# Patient Record
Sex: FEMALE | Race: WHITE | Employment: OTHER | URBAN - METROPOLITAN AREA
[De-identification: names, ages, dates, MRNs, and addresses within clinical notes are randomized per-mention and may not be internally consistent; named-entity substitution may affect disease eponyms.]

---

## 2018-03-19 ENCOUNTER — OFFICE VISIT (OUTPATIENT)
Dept: NEUROLOGY | Age: 78
End: 2018-03-19
Payer: MEDICARE

## 2018-03-19 VITALS
DIASTOLIC BLOOD PRESSURE: 97 MMHG | BODY MASS INDEX: 35.33 KG/M2 | TEMPERATURE: 97.4 F | SYSTOLIC BLOOD PRESSURE: 171 MMHG | HEART RATE: 87 BPM | WEIGHT: 192 LBS | HEIGHT: 62 IN | RESPIRATION RATE: 18 BRPM | OXYGEN SATURATION: 94 %

## 2018-03-19 DIAGNOSIS — G35 MS (MULTIPLE SCLEROSIS) (HCC): Primary | ICD-10-CM

## 2018-03-19 PROCEDURE — 99214 OFFICE O/P EST MOD 30 MIN: CPT | Performed by: CLINICAL NURSE SPECIALIST

## 2018-03-19 PROCEDURE — 1036F TOBACCO NON-USER: CPT | Performed by: CLINICAL NURSE SPECIALIST

## 2018-03-19 PROCEDURE — 1090F PRES/ABSN URINE INCON ASSESS: CPT | Performed by: CLINICAL NURSE SPECIALIST

## 2018-03-19 PROCEDURE — G8427 DOCREV CUR MEDS BY ELIG CLIN: HCPCS | Performed by: CLINICAL NURSE SPECIALIST

## 2018-03-19 PROCEDURE — 4040F PNEUMOC VAC/ADMIN/RCVD: CPT | Performed by: CLINICAL NURSE SPECIALIST

## 2018-03-19 PROCEDURE — 1123F ACP DISCUSS/DSCN MKR DOCD: CPT | Performed by: CLINICAL NURSE SPECIALIST

## 2018-03-19 PROCEDURE — G8417 CALC BMI ABV UP PARAM F/U: HCPCS | Performed by: CLINICAL NURSE SPECIALIST

## 2018-03-19 PROCEDURE — G8400 PT W/DXA NO RESULTS DOC: HCPCS | Performed by: CLINICAL NURSE SPECIALIST

## 2018-03-19 PROCEDURE — G8484 FLU IMMUNIZE NO ADMIN: HCPCS | Performed by: CLINICAL NURSE SPECIALIST

## 2018-03-19 NOTE — PROGRESS NOTES
Helena Lawrence is a 69 y.o. right handed female     Long history of MS -- diagnosed decades ago by Dr Emily Hardin since refused DMT -- continues to do so today (given her age, she would not be a candidate for DMT)    Last imaging studies in 2013    Pains markedly improved since last appt - now following at the pain clinic     Reportedly had tried gabapentin, Lyrica as well as Trileptal in the past    Hesitant to trial Cymbalta in the past     No new neuro issues   Feels she is quite \"stable\"    Notices more unsteadiness but no falls reported     No new medical events    States BS have been stable    Very busy around the [place] lately     No chest pain or palpitations  No SOB  No vertigo, lightheadedness or loss of consciousness  No falls, tripping or stumbling  No incontinence of bowels or bladder  No itching or bruising appreciated  No numbness, tingling or focal arm/leg weakness    ROS otherwise negative     Prior to Visit Medications    Medication Sig Taking? Authorizing Provider   ACCU-CHEK RACHEL PLUS strip  Yes Historical Provider, MD   Accu-Chek Softclix Lancets MISC  Yes Historical Provider, MD   metFORMIN (GLUCOPHAGE) 1000 MG tablet   Take 1,000 mg by mouth 2 times daily (with meals)  Yes Historical Provider, MD   NUCYNTA ER 50 MG TB12   50 mg Pt takes 50 mg in am ,100 mg at night Yes Historical Provider, MD   HUMALOG MIX 75/25 KWIKPEN (75-25) 100 UNIT/ML SUPN injection pen 2 times daily (with meals) Pt takes 28 units and 26 units at night Yes Historical Provider, MD   BD PEN NEEDLE FAROOQ U/F 32G X 4 MM MISC  Yes Historical Provider, MD   irbesartan (AVAPRO) 300 MG tablet   Take 300 mg by mouth daily  Yes Historical Provider, MD   hydrochlorothiazide (HYDRODIURIL) 25 MG tablet Take 25 mg by mouth daily. Yes Historical Provider, MD   allopurinol (ZYLOPRIM) 100 MG tablet Take 100 mg by mouth daily. Yes Historical Provider, MD   atenolol (TENORMIN) 25 MG tablet Take 25 mg by mouth daily.  Yes Historical Provider, MD   levothyroxine (SYNTHROID) 25 MCG tablet Take 25 mcg by mouth Daily  Yes Historical Provider, MD   rosuvastatin (CRESTOR) 5 MG tablet Take 5 mg by mouth daily.  Yes Historical Provider, MD       Objective:       BP (!) 171/97 (Site: Left Arm, Position: Sitting, Cuff Size: Large Adult)   Pulse 87   Temp 97.4 °F (36.3 °C)   Resp 18   Ht 5' 2\" (1.575 m)   Wt 192 lb (87.1 kg)   SpO2 94%   BMI 35.12 kg/m²   afebrile      General appearance: alert, appears stated age and cooperative  Head: Normocephalic, without obvious abnormality, atraumatic  Neck: no adenopathy, no carotid bruit, no JVD, supple, symmetrical, limited ROM  Lungs: clear to auscultation bilaterally  Heart: regular rate and rhythm  Extremities: no cyanosis or edema - no clubbing   Pulses: 2+ and symmetric  Skin: No rashes or lesions     Mental Status: alert and oriented times three    Speech: clear    Language: normal      Cranial Nerves:  I: smell    II: visual acuity     II: visual fields Full    II: pupils BRIT   III,VII: ptosis    III,IV,VI: extraocular muscles  Full ROM   V: mastication Normal   V: facial light touch sensation  normal   V,VII: corneal reflex  Present   VII: facial muscle function - upper     VII: facial muscle function - lower Normal   VIII: hearing Normal   IX: soft palate elevation  Normal   IX,X: gag reflex Present   XI: trapezius strength  5/5   XI: sternocleidomastoid strength 5/5   XI: neck extension strength  5/5   XII: tongue strength  Normal       Motor:  5/5 throughout   Normal bulk and tone    Sensory:  LT and PP stocking glove to mid shin  Vibration moderately decreased in knees   Markedly decreased left ankle     Coordination:   FN, FFM and SAYRA symmetrical and decreased   HS symmetrical     Gait:  Wide based - petit pas   With rollator      DTR:   Right Brachioradialis reflex 0  Left Brachioradialis reflex 0  Right Biceps reflex 0  Left Biceps reflex 0  Right Quadriceps reflex 0  Left Quadriceps reflex 0  Right Achilles reflex 0  Left Achilles reflex 0    No Lemon's    Laboratory/Radiology:     A1C due this month     MRI Brain: 2013   Moderate periventricular WM disease    MRI C-spine 2013   No lesions identified   I appreciate moderate amount of stenosis    MRI T- spine 2013   No lesions identified    Imaging studies personally reviewed with her at this time     Assessment:     Long history of MS -- now with EDSS 7.0    Suspect transverse myelitis causing her burning sensations   Now following with the pain clinic     Diabetes    Suspect diabetic PN as well    Plan:      Will hold off on DMT and imaging studies    Will continue to manage symptomatically    Call with continued issues     RTO 6 months    Dock Luc  9:07 AM  3/19/2018

## 2018-08-06 ENCOUNTER — OFFICE VISIT (OUTPATIENT)
Dept: NEUROLOGY | Age: 78
End: 2018-08-06
Payer: MEDICARE

## 2018-08-06 VITALS
SYSTOLIC BLOOD PRESSURE: 149 MMHG | OXYGEN SATURATION: 96 % | HEART RATE: 80 BPM | HEIGHT: 64 IN | BODY MASS INDEX: 36.79 KG/M2 | DIASTOLIC BLOOD PRESSURE: 87 MMHG | TEMPERATURE: 97.9 F | WEIGHT: 215.5 LBS

## 2018-08-06 DIAGNOSIS — G35 MS (MULTIPLE SCLEROSIS) (HCC): Primary | ICD-10-CM

## 2018-08-06 PROCEDURE — G8417 CALC BMI ABV UP PARAM F/U: HCPCS | Performed by: CLINICAL NURSE SPECIALIST

## 2018-08-06 PROCEDURE — G8427 DOCREV CUR MEDS BY ELIG CLIN: HCPCS | Performed by: CLINICAL NURSE SPECIALIST

## 2018-08-06 PROCEDURE — 1101F PT FALLS ASSESS-DOCD LE1/YR: CPT | Performed by: CLINICAL NURSE SPECIALIST

## 2018-08-06 PROCEDURE — 4040F PNEUMOC VAC/ADMIN/RCVD: CPT | Performed by: CLINICAL NURSE SPECIALIST

## 2018-08-06 PROCEDURE — 1036F TOBACCO NON-USER: CPT | Performed by: CLINICAL NURSE SPECIALIST

## 2018-08-06 PROCEDURE — G8400 PT W/DXA NO RESULTS DOC: HCPCS | Performed by: CLINICAL NURSE SPECIALIST

## 2018-08-06 PROCEDURE — 1090F PRES/ABSN URINE INCON ASSESS: CPT | Performed by: CLINICAL NURSE SPECIALIST

## 2018-08-06 PROCEDURE — 99214 OFFICE O/P EST MOD 30 MIN: CPT | Performed by: CLINICAL NURSE SPECIALIST

## 2018-08-06 PROCEDURE — 1123F ACP DISCUSS/DSCN MKR DOCD: CPT | Performed by: CLINICAL NURSE SPECIALIST

## 2018-08-06 NOTE — PROGRESS NOTES
Jimbo Banda is a 69 y.o. right handed female     Long history of MS -- diagnosed decades ago by Dr Adarsh Mcfarland 7   Long since refused DMT -- continues to do so today (given her age, she would not be a candidate for DMT)    Last imaging studies in 2013 -- consistently refused subsequent imaging tests     Pains markedly improved since last appt - now following at the pain clinic   Reportedly had tried gabapentin, Lyrica as well as Trileptal in the past    Hesitant to trial Cymbalta     No new neuro issues   Feels she is quite \"stable\"    Notices more unsteadiness but no falls reported    Still using her Rolator without issues     No new medical events    States BS have been stable    Very busy around the [place] lately     No chest pain or palpitations  No SOB  No vertigo, lightheadedness or loss of consciousness  No falls, tripping or stumbling  No incontinence of bowels or bladder  No itching or bruising appreciated  No numbness, tingling or focal arm/leg weakness    ROS otherwise negative     Prior to Visit Medications    Medication Sig Taking? Authorizing Provider   ACCU-CHEK RACHEL PLUS strip  Yes Historical Provider, MD   Accu-Chek Softclix Lancets MISC  Yes Historical Provider, MD   metFORMIN (GLUCOPHAGE) 1000 MG tablet   Take 1,000 mg by mouth 2 times daily (with meals)  Yes Historical Provider, MD   NUCYNTA ER 50 MG TB12   50 mg Pt takes 50 mg in am ,100 mg at night Yes Historical Provider, MD   HUMALOG MIX 75/25 KWIKPEN (75-25) 100 UNIT/ML SUPN injection pen 2 times daily (with meals) Pt takes 28 units and 26 units at night Yes Historical Provider, MD   BD PEN NEEDLE FAROOQ U/F 32G X 4 MM MISC  Yes Historical Provider, MD   irbesartan (AVAPRO) 300 MG tablet   Take 300 mg by mouth daily  Yes Historical Provider, MD   hydrochlorothiazide (HYDRODIURIL) 25 MG tablet Take 25 mg by mouth daily. Yes Historical Provider, MD   allopurinol (ZYLOPRIM) 100 MG tablet Take 100 mg by mouth daily.  Yes Historical Provider, MD   atenolol (TENORMIN) 25 MG tablet Take 25 mg by mouth daily. Yes Historical Provider, MD   levothyroxine (SYNTHROID) 25 MCG tablet Take 25 mcg by mouth Daily  Yes Historical Provider, MD   rosuvastatin (CRESTOR) 5 MG tablet Take 5 mg by mouth daily.  Yes Historical Provider, MD       Objective:     BP (!) 149/87 (Site: Right Arm, Position: Sitting, Cuff Size: Large Adult)   Pulse 80   Temp 97.9 °F (36.6 °C)   Ht 5' 4\" (1.626 m)   Wt 215 lb 8 oz (97.8 kg)   SpO2 96%   BMI 36.99 kg/m²   afebrile      General appearance: alert, appears stated age and cooperative  Head: Normocephalic, without obvious abnormality, atraumatic  Neck: no adenopathy, no carotid bruit, no JVD, supple, symmetrical, limited ROM  Lungs: clear to auscultation bilaterally  Heart: regular rate and rhythm  Extremities: no cyanosis or edema - no clubbing   Pulses: 2+ and symmetric  Skin: No rashes or lesions     Mental Status: alert and oriented times three    Speech: clear  Language: normal      Cranial Nerves:  I: smell    II: visual acuity     II: visual fields Full    II: pupils BRIT   III,VII: ptosis    III,IV,VI: extraocular muscles  Full ROM   V: mastication Normal   V: facial light touch sensation  normal   V,VII: corneal reflex  Present   VII: facial muscle function - upper     VII: facial muscle function - lower Normal   VIII: hearing Normal   IX: soft palate elevation  Normal   IX,X: gag reflex Present   XI: trapezius strength  5/5   XI: sternocleidomastoid strength 5/5   XI: neck extension strength  5/5   XII: tongue strength  Normal       Motor:  5/5 throughout   Normal bulk and tone    Sensory:  LT and PP stocking glove to mid shin  Vibration moderately decreased in knees   Markedly decreased left ankle     Coordination:   FN, FFM and SAYRA symmetrical and decreased   HS symmetrical     Gait:  Wide based - petit pas   With rollator      DTR:   Right Brachioradialis reflex 0  Left Brachioradialis reflex 0  Right Biceps reflex 0  Left Biceps reflex 0  Right Quadriceps reflex 0  Left Quadriceps reflex 0  Right Achilles reflex 0  Left Achilles reflex 0    No Lemon's    Laboratory/Radiology:     A1C due this month     MRI Brain: 2013   Moderate periventricular WM disease    MRI C-spine 2013   No lesions identified   I appreciate moderate amount of stenosis    MRI T- spine 2013   No lesions identified    Imaging studies personally reviewed with her at this time     Assessment:     Long history of MS -- now with EDSS 7.0    Suspect transverse myelitis causing her burning sensations   Now following with the pain clinic     Diabetes    Suspect diabetic PN as well    Plan:      Will hold off on DMT and imaging studies     Will continue to manage symptomatically    Call with continued issues     RTO 6 months    Dragan Roman  10:55 AM  8/6/2018

## 2018-11-21 ENCOUNTER — TELEPHONE (OUTPATIENT)
Dept: NEUROLOGY | Age: 78
End: 2018-11-21

## 2019-01-04 ENCOUNTER — HOSPITAL ENCOUNTER (OUTPATIENT)
Age: 79
Discharge: HOME | End: 2019-01-06
Payer: MEDICARE

## 2019-01-04 LAB — RAPID HIV 1&2: NORMAL

## 2019-01-04 PROCEDURE — 86803 HEPATITIS C AB TEST: CPT

## 2019-01-04 PROCEDURE — 86703 HIV-1/HIV-2 1 RESULT ANTBDY: CPT

## 2019-01-04 PROCEDURE — 87340 HEPATITIS B SURFACE AG IA: CPT

## 2019-01-04 PROCEDURE — 86701 HIV-1ANTIBODY: CPT

## 2019-01-07 LAB
HEPATITIS B SURFACE ANTIGEN INTERPRETATION: NORMAL
HEPATITIS C ANTIBODY INTERPRETATION: NORMAL
HIV-1 AND HIV-2 ANTIBODIES: NORMAL

## 2019-10-21 ENCOUNTER — TELEPHONE (OUTPATIENT)
Dept: ADMINISTRATIVE | Age: 79
End: 2019-10-21

## 2020-02-10 ENCOUNTER — OFFICE VISIT (OUTPATIENT)
Dept: NEUROLOGY | Age: 80
End: 2020-02-10
Payer: MEDICARE

## 2020-02-10 VITALS
HEART RATE: 78 BPM | WEIGHT: 207 LBS | RESPIRATION RATE: 17 BRPM | SYSTOLIC BLOOD PRESSURE: 135 MMHG | BODY MASS INDEX: 38.09 KG/M2 | HEIGHT: 62 IN | DIASTOLIC BLOOD PRESSURE: 84 MMHG | OXYGEN SATURATION: 95 %

## 2020-02-10 PROCEDURE — 1123F ACP DISCUSS/DSCN MKR DOCD: CPT | Performed by: CLINICAL NURSE SPECIALIST

## 2020-02-10 PROCEDURE — G8427 DOCREV CUR MEDS BY ELIG CLIN: HCPCS | Performed by: CLINICAL NURSE SPECIALIST

## 2020-02-10 PROCEDURE — 99214 OFFICE O/P EST MOD 30 MIN: CPT | Performed by: CLINICAL NURSE SPECIALIST

## 2020-02-10 PROCEDURE — 1090F PRES/ABSN URINE INCON ASSESS: CPT | Performed by: CLINICAL NURSE SPECIALIST

## 2020-02-10 PROCEDURE — 1036F TOBACCO NON-USER: CPT | Performed by: CLINICAL NURSE SPECIALIST

## 2020-02-10 PROCEDURE — G8417 CALC BMI ABV UP PARAM F/U: HCPCS | Performed by: CLINICAL NURSE SPECIALIST

## 2020-02-10 PROCEDURE — 4040F PNEUMOC VAC/ADMIN/RCVD: CPT | Performed by: CLINICAL NURSE SPECIALIST

## 2020-02-10 PROCEDURE — G8484 FLU IMMUNIZE NO ADMIN: HCPCS | Performed by: CLINICAL NURSE SPECIALIST

## 2020-02-10 PROCEDURE — G8400 PT W/DXA NO RESULTS DOC: HCPCS | Performed by: CLINICAL NURSE SPECIALIST

## 2020-02-10 NOTE — PROGRESS NOTES
Adeel Chau is a 67 y.o. right handed female     Long history of MS -- diagnosed decades ago by Dr Leatha Kang since refused DMT -- continues to do so today (given her age, she would not be a candidate for DMT)    Last imaging studies in 2013 -- consistently refused subsequent imaging tests     Pains markedly improved now following at the pain clinic   Reportedly had tried gabapentin, Lyrica as well as Trileptal in the past    Hesitant to trial Cymbalta     No new neuro issues   Feels she is quite \"stable\"    Notices more unsteadiness but no falls reported    Still using her Rolator without issues     No new medical events    States BS have been stable    Very busy around the [place] lately     No chest pain or palpitations  No SOB  No vertigo, lightheadedness or loss of consciousness  No falls, tripping or stumbling  No incontinence of bowels or bladder  No itching or bruising appreciated  No numbness, tingling or focal arm/leg weakness    ROS otherwise negative     Prior to Visit Medications    Medication Sig Taking? Authorizing Provider   ACCU-CHEK RACHEL PLUS strip  Yes Historical Provider, MD   Accu-Chek Softclix Lancets MISC  Yes Historical Provider, MD   metFORMIN (GLUCOPHAGE) 1000 MG tablet   Take 1,000 mg by mouth 2 times daily (with meals)  Yes Historical Provider, MD   NUCYNTA ER 50 MG TB12   50 mg Pt takes 50 mg in am ,100 mg at night Yes Historical Provider, MD   HUMALOG MIX 75/25 KWIKPEN (75-25) 100 UNIT/ML SUPN injection pen 2 times daily (with meals) Pt takes 28 units and 26 units at night Yes Historical Provider, MD   BD PEN NEEDLE FAROOQ U/F 32G X 4 MM MISC  Yes Historical Provider, MD   irbesartan (AVAPRO) 300 MG tablet   Take 300 mg by mouth daily  Yes Historical Provider, MD   hydrochlorothiazide (HYDRODIURIL) 25 MG tablet Take 25 mg by mouth daily. Yes Historical Provider, MD   allopurinol (ZYLOPRIM) 100 MG tablet Take 100 mg by mouth daily.  Yes Historical Provider, MD   atenolol Quadriceps reflex 0  Left Quadriceps reflex 0  Right Achilles reflex 0  Left Achilles reflex 0    No Lemon's    Laboratory/Radiology:     A1C due this month     MRI Brain: 2013   Moderate periventricular WM disease    MRI C-spine 2013   No lesions identified   I appreciate moderate amount of stenosis    MRI T- spine 2013   No lesions identified    Imaging studies personally reviewed with her at this time     Assessment:     Long history of MS -- now with EDSS 7.0    Suspect transverse myelitis causing her burning sensations   Now following with the pain clinic     Diabetes    Suspect diabetic PN as well    Plan:      Will hold off on DMT and imaging studies     Will continue to manage symptomatically    Call with continued issues     RTO 6 months    Erasmo Brian  1:54 PM  2/10/2020

## 2020-09-02 ENCOUNTER — VIRTUAL VISIT (OUTPATIENT)
Dept: NEUROLOGY | Age: 80
End: 2020-09-02
Payer: MEDICARE

## 2020-09-02 PROCEDURE — 99443 PR PHYS/QHP TELEPHONE EVALUATION 21-30 MIN: CPT | Performed by: CLINICAL NURSE SPECIALIST

## 2020-09-02 NOTE — PROGRESS NOTES
Dorian Drummond is a 79 y.o. right handed female     evaluated via telephone on 9/2/2020. Consent:  She and/or health care decision maker is aware that that she may receive a bill for this telephone service, depending on her insurance coverage, and has provided verbal consent to proceed: Yes    I affirm this is a Patient Initiated Episode with an Established Patient who has not had a related appointment within my department in the past 7 days or scheduled within the next 24 hours. The patient's identity was verified at the start of the call. Total Time: minutes: 21-30 minutes    Consent:  The patient and/or health care decision maker is aware that that he may receive a bill for this telephone service, depending on his insurance coverage, and has provided verbal consent to proceed: Yes    Patient advised regarding steps to help prevent the spread of COVID-19   SOURCE - https://radha-caesar.info/. html     1-Stay home except to get medical care  2-Clean your hands often for atleast 20 secnds, avoid touching: Avoid touching your eyes, nose, and mouth with unwashed hands. 3-Seek medical attention: Seek prompt medical attention if your illness is worsening (e.g., difficulty breathing).   Call you doctor first.  3-Wear a facemask if you are sick   4-Cover your coughs and sneezes          Note: not billable if this call serves to triage the patient into an appointment for the relevant concern    Long history of MS -- diagnosed decades ago by Dr Dayton Be    EDSS 7   Long since refused DMT -- continues to do so today (given her age, she would not be a candidate for DMT)    Last imaging studies in 2013     Pains markedly improved now following at the pain clinic   Reportedly tried gabapentin, Lyrica as well as Trileptal in the past    No new neuro issues   Feels she is quite \"stable\"    Notices more unsteadiness but no falls reported    Still using her Rolator without issues 2013   No lesions identified    Imaging studies personally reviewed with her at this time     Assessment:     Long history of MS -- now with EDSS 7.0    Suspect transverse myelitis causing her burning sensations   Now following with the pain clinic     Diabetes    Suspect diabetic PN as well    Plan:      Will hold off on DMT and imaging studies     Will continue to manage symptomatically    Call with continued issues     RTO 6 months unless still under Quarantine at [facility]  7:16 AM  9/2/2020

## 2020-09-02 NOTE — PROGRESS NOTES
Bhavani Medeiros was read the following message We want to confirm that, for purposes of billing, this is a virtual visit with your provider for which we will submit a claim for reimbursement with your insurance company. You will be responsible for any copays, coinsurance amounts or other amounts not covered by your insurance company. If you do not accept this, unfortunately we will not be able to schedule a virtual visit with the provider. Do you accept? Anai Soni responded Yes .

## 2021-01-28 ENCOUNTER — IMMUNIZATION (OUTPATIENT)
Dept: PRIMARY CARE CLINIC | Age: 81
End: 2021-01-28
Payer: MEDICARE

## 2021-01-28 PROCEDURE — 0001A COVID-19, PFIZER VACCINE 30MCG/0.3ML DOSE: CPT

## 2021-01-28 PROCEDURE — 91300 COVID-19, PFIZER VACCINE 30MCG/0.3ML DOSE: CPT

## 2021-02-18 ENCOUNTER — IMMUNIZATION (OUTPATIENT)
Dept: PRIMARY CARE CLINIC | Age: 81
End: 2021-02-18
Payer: MEDICARE

## 2021-02-18 PROCEDURE — 91300 COVID-19, PFIZER VACCINE 30MCG/0.3ML DOSE: CPT | Performed by: NURSE PRACTITIONER

## 2021-02-18 PROCEDURE — 0002A COVID-19, PFIZER VACCINE 30MCG/0.3ML DOSE: CPT | Performed by: NURSE PRACTITIONER

## 2021-04-14 ENCOUNTER — OFFICE VISIT (OUTPATIENT)
Dept: NEUROLOGY | Age: 81
End: 2021-04-14
Payer: MEDICARE

## 2021-04-14 VITALS
RESPIRATION RATE: 20 BRPM | WEIGHT: 207 LBS | OXYGEN SATURATION: 95 % | BODY MASS INDEX: 38.09 KG/M2 | HEIGHT: 62 IN | HEART RATE: 83 BPM | DIASTOLIC BLOOD PRESSURE: 98 MMHG | SYSTOLIC BLOOD PRESSURE: 188 MMHG

## 2021-04-14 DIAGNOSIS — G35 MS (MULTIPLE SCLEROSIS) (HCC): Primary | ICD-10-CM

## 2021-04-14 DIAGNOSIS — M16.12 ARTHRITIS OF LEFT HIP: ICD-10-CM

## 2021-04-14 PROCEDURE — G8417 CALC BMI ABV UP PARAM F/U: HCPCS | Performed by: CLINICAL NURSE SPECIALIST

## 2021-04-14 PROCEDURE — 1123F ACP DISCUSS/DSCN MKR DOCD: CPT | Performed by: CLINICAL NURSE SPECIALIST

## 2021-04-14 PROCEDURE — G8427 DOCREV CUR MEDS BY ELIG CLIN: HCPCS | Performed by: CLINICAL NURSE SPECIALIST

## 2021-04-14 PROCEDURE — G8400 PT W/DXA NO RESULTS DOC: HCPCS | Performed by: CLINICAL NURSE SPECIALIST

## 2021-04-14 PROCEDURE — 99214 OFFICE O/P EST MOD 30 MIN: CPT | Performed by: CLINICAL NURSE SPECIALIST

## 2021-04-14 PROCEDURE — 1036F TOBACCO NON-USER: CPT | Performed by: CLINICAL NURSE SPECIALIST

## 2021-04-14 PROCEDURE — 4040F PNEUMOC VAC/ADMIN/RCVD: CPT | Performed by: CLINICAL NURSE SPECIALIST

## 2021-04-14 PROCEDURE — 1090F PRES/ABSN URINE INCON ASSESS: CPT | Performed by: CLINICAL NURSE SPECIALIST

## 2024-06-09 NOTE — PROGRESS NOTES
Jose Meeks is a 79 y.o. right handed female     Long history of MS -- diagnosed decades ago by Dr Petty Ynu since refused DMT   Last imaging studies in 2013     Pains markedly improved now following at the pain clinic   Reportedly tried gabapentin, Lyrica as well as Trileptal in the past    Started having weakness in her legs more noticeably early last week  Shortly after receiving her second Pf[employer] vaccine  No falls    Refitted for AFO and picking up this week    Also c/o increasing left hip pains    Previously dx with aseptic necrosis   Followed with dr Abdi in past     No new medical events    States BS have been stable    No chest pain or palpitations  No SOB  No vertigo, lightheadedness or loss of consciousness  No falls, tripping or stumbling  No incontinence of bowels or bladder  No itching or bruising appreciated  No numbness, tingling or focal arm/leg weakness    ROS otherwise negative     Prior to Visit Medications    Medication Sig Taking? Authorizing Provider   ACCU-CHEK RACHEL PLUS strip  Yes Historical Provider, MD   Accu-Chek Softclix Lancets MISC  Yes Historical Provider, MD   metFORMIN (GLUCOPHAGE) 1000 MG tablet Take 500 mg by mouth 2 times daily (with meals)  Yes Historical Provider, MD   NUCYNTA ER 50 MG TB12   50 mg Pt takes 50 mg in am ,100 mg at night Yes Historical Provider, MD   HUMALOG MIX 75/25 KWIKPEN (75-25) 100 UNIT/ML SUPN injection pen 2 times daily (with meals) Pt takes 28 units and 26 units at night Yes Historical Provider, MD   BD PEN NEEDLE FAROOQ U/F 32G X 4 MM MISC  Yes Historical Provider, MD   irbesartan (AVAPRO) 300 MG tablet   Take 300 mg by mouth daily  Yes Historical Provider, MD   hydrochlorothiazide (HYDRODIURIL) 25 MG tablet Take 25 mg by mouth daily. Yes Historical Provider, MD   allopurinol (ZYLOPRIM) 100 MG tablet Take 100 mg by mouth daily. Yes Historical Provider, MD   atenolol (TENORMIN) 25 MG tablet Take 25 mg by mouth daily.  Yes Historical lesions identified   I appreciate moderate amount of stenosis    MRI T- spine 2013   No lesions identified    Imaging studies personally reviewed with her at this time     Assessment:     Long history of MS -- now with EDSS 7.0   Increased weakness post second vaccination     Possibly magnified immune response     Suspect transverse myelitis causing her burning sensations   Now following with the pain clinic     Diabetes    Suspect diabetic PN as well    Plan:      Will hold off on DMT and imaging studies     Will continue to manage symptomatically -- she agrees   PT to follow after AFO obtained     Ortho to follow hip pains     Call with continued issues     RTO 2-3 months    Chandra Christie  10:31 AM  4/14/2021 No